# Patient Record
Sex: FEMALE | Race: WHITE | NOT HISPANIC OR LATINO | URBAN - NONMETROPOLITAN AREA
[De-identification: names, ages, dates, MRNs, and addresses within clinical notes are randomized per-mention and may not be internally consistent; named-entity substitution may affect disease eponyms.]

---

## 2024-03-20 ENCOUNTER — OV EP (OUTPATIENT)
Dept: URBAN - NONMETROPOLITAN AREA CLINIC 2 | Facility: CLINIC | Age: 39
End: 2024-03-20
Payer: SELF-PAY

## 2024-03-20 VITALS
WEIGHT: 165 LBS | SYSTOLIC BLOOD PRESSURE: 118 MMHG | HEART RATE: 89 BPM | HEIGHT: 65 IN | DIASTOLIC BLOOD PRESSURE: 78 MMHG | BODY MASS INDEX: 27.49 KG/M2

## 2024-03-20 DIAGNOSIS — K58.0 IRRITABLE BOWEL SYNDROME WITH DIARRHEA: ICD-10-CM

## 2024-03-20 PROBLEM — 197125005: Status: ACTIVE | Noted: 2024-03-20

## 2024-03-20 PROCEDURE — 99204 OFFICE O/P NEW MOD 45 MIN: CPT | Performed by: NURSE PRACTITIONER

## 2024-03-20 RX ORDER — DICYCLOMINE HYDROCHLORIDE 10 MG/1
1 CAPSULE 30 MINUTES BEFORE EATING CAPSULE ORAL
Qty: 90 | Refills: 3 | OUTPATIENT
Start: 2024-03-20 | End: 2024-07-18

## 2024-03-20 NOTE — HPI-TODAY'S VISIT:
Belkys Green is a 38-year-old female who presents today for intermittent diarrhea.  She reports that this began around 4 to 5 weeks ago prior to her trip to [place].  She would pass a significant mount of stool every 3 days.  Diarrhea seems worse after eating and so she stopped eating as much.  She occasionally passes normal stools in between bouts of diarrhea.  She is having some cramping  prior and urgency to bowel movements.  Also endorses some nausea.  Denies any blood in her stool and her weight has been stable.  She has tried Florastor with only some improvement.  Had labs drawn yesterday.  She states her stomach feels "anxious" but she is not anxious about anything in particular currently.  LG.

## 2024-05-08 ENCOUNTER — OFFICE VISIT (OUTPATIENT)
Dept: URBAN - NONMETROPOLITAN AREA CLINIC 2 | Facility: CLINIC | Age: 39
End: 2024-05-08
Payer: SELF-PAY

## 2024-05-08 ENCOUNTER — DASHBOARD ENCOUNTERS (OUTPATIENT)
Age: 39
End: 2024-05-08

## 2024-05-08 VITALS — HEIGHT: 65 IN

## 2024-05-08 DIAGNOSIS — K58.0 IRRITABLE BOWEL SYNDROME WITH DIARRHEA: ICD-10-CM

## 2024-05-08 PROCEDURE — 99212 OFFICE O/P EST SF 10 MIN: CPT | Performed by: NURSE PRACTITIONER

## 2024-05-08 RX ORDER — DICYCLOMINE HYDROCHLORIDE 10 MG/1
1 CAPSULE 30 MINUTES BEFORE EATING CAPSULE ORAL
Qty: 90 | Refills: 3 | Status: ACTIVE | COMMUNITY
Start: 2024-03-20 | End: 2024-07-18